# Patient Record
(demographics unavailable — no encounter records)

---

## 2025-02-20 NOTE — PHYSICAL EXAM
[General Appearance - Alert] : alert [General Appearance - In No Acute Distress] : in no acute distress [Outer Ear] : the ears and nose were normal in appearance [Oropharynx] : the oropharynx was normal [Neck Appearance] : the appearance of the neck was normal [Jugular Venous Distention Increased] : there was no jugular-venous distention [] : no respiratory distress [Heart Rate And Rhythm] : heart rate was normal and rhythm regular [Heart Sounds] : normal S1 and S2 [Bowel Sounds] : normal bowel sounds [Abdomen Soft] : soft [Abdomen Tenderness] : non-tender [Cervical Lymph Nodes Enlarged Posterior Bilaterally] : posterior cervical [Cervical Lymph Nodes Enlarged Anterior Bilaterally] : anterior cervical [Supraclavicular Lymph Nodes Enlarged Bilaterally] : supraclavicular [Coordination Grossly Intact] : coordination grossly intact [Normal Gait] : normal gait [Normal Affect] : the affect was normal [Normal Insight/Judgement] : insight and judgment were intact [FreeTextEntry1] : Seborrheic keratoses

## 2025-02-20 NOTE — PLAN
[FreeTextEntry1] : 1. Continue current medications as outlined above.  2. The patient underwent bloodwork including a CBC, BMP, and BNP today 2/20/25.    3. Follow up in 3 months with NP Tabatha Pierre.  She will be given a prescription for yearly routine blood work at that visit.  The blood will be performed prior to her visit with me 3 months later.  She may need to have a CBC, basic metabolic panel, and BN P also performed on the day of that visit with Ms. Pierre   4. Follow up in 6 months with wellness, routine fasting bloodwork, and EKG.   5. The patient has been referred to cardiologist, Dr. Zayda Guerrero.  I have told both her and her daughter, that she needs to have her cardiology care closer to her home, and not at [facility].  6. The patient has been advised to wear compression stockings due to lower extremity edema.  She states that she is unable to do so due to the fact that they are too painful.  She has also been instructed to keep her legs elevated.  Of note is the fact that she does have a recliner which may help her in this regard   7. Cardiovascular exercise as tolerated.  8.  I have told the patient, and her daughter, Zuly, that she needs to keep a daily log of her weight.  Her weight needs to be performed at the same time every day with the same amount of clothing on her body.

## 2025-02-20 NOTE — HISTORY OF PRESENT ILLNESS
[FreeTextEntry1] :  The patient comes in for a routine follow-up evaluation. [de-identified] : The patient was in her usual state of health until December 20th when she developed bloody stool. She initially went to , but the patient was then transferred to [facility] for inpatient capsule endoscopy. A VCE was performed, and the images were reviewed, which showed no acute bleeding or source of bleeding, but did note old blood in colon. The patient was subsequently transitioned from Xarelto to Eliquis 2.5 MG BID. The patient was discharged on December 27th.  Apparently, since she has been on the Eliquis, there has been no further bleeding.  The patient was hospitalized once again on 2/1/25 c/o SOB and worsening LE edema. Upon admission, her hemoglobin was 9.2 and her BNP was 2080. She was diagnosed with acute hypoxic respiratory failure with worsening LE edema with acute on Chronic HFpEF.  The patient was diuresed with IV Lasix 40 mg daily with BNP improving from 2,080 on admission to 1,346 and 1,148 on discharge.  The patient also had chronic venous stasis dermatitis with no sign of cellulitis. she was not treated with IV antibiotics. The patient discharged on oral Lasix 60 mg po daily for an additional 1 week and then reduce to outpatient dose of 40 mg po daily. The patient followed up with her cardiologist, Dr. Beck, last week. Dr. Beck increased the patient's Lasix to 60 MG BID because of significant lower extremity edema, and added Amiodarone 200 MG to her regimen to treat her atrial fibrillation.   At this time, the patient reports that her breathlessness has improved since being discharged. She does state that her legs continue to hurt. She has been weighing herself at home, stating that her weight typically ranges around 140 lbs. She notes that her highest weight has been 145 since she was discharged. Her weight this morning was 139 lbs. There has been no chest pain, shortness of breath, palpitations, or PND. There have been no cough, fevers, chills, or night sweats. There have been no other acute constitutional symptoms. She comes in for this assessment.

## 2025-02-20 NOTE — ADDENDUM
[FreeTextEntry1] : This note was written by Sharon Stephenson on 02/20/2025 acting as a medical scribe for Dr. Duke Fair MD. All medical entries made by the scribe were at my, Dr. Duke Fari's, direction and personally dictated by me on 02/20/2025. I have reviewed the chart and agree that the record accurately reflects my personal performance of the history, review of systems, assessment, and plan. I have also personally directed, reviewed, and agreed with the chart.